# Patient Record
Sex: FEMALE | Race: WHITE | NOT HISPANIC OR LATINO | URBAN - METROPOLITAN AREA
[De-identification: names, ages, dates, MRNs, and addresses within clinical notes are randomized per-mention and may not be internally consistent; named-entity substitution may affect disease eponyms.]

---

## 2024-01-31 ENCOUNTER — APPOINTMENT (OUTPATIENT)
Age: 49
Setting detail: DERMATOLOGY
End: 2024-02-12

## 2024-01-31 VITALS — TEMPERATURE: 97.9 F

## 2024-01-31 DIAGNOSIS — L30.1 DYSHIDROSIS [POMPHOLYX]: ICD-10-CM

## 2024-01-31 PROCEDURE — OTHER MIPS QUALITY: OTHER

## 2024-01-31 PROCEDURE — 99203 OFFICE O/P NEW LOW 30 MIN: CPT

## 2024-01-31 PROCEDURE — OTHER PRESCRIPTION MEDICATION MANAGEMENT: OTHER

## 2024-01-31 PROCEDURE — OTHER COUNSELING: OTHER

## 2024-01-31 PROCEDURE — OTHER PHOTO-DOCUMENTATION: OTHER

## 2024-01-31 PROCEDURE — OTHER PRESCRIPTION: OTHER

## 2024-01-31 PROCEDURE — OTHER ITCH-SCRATCH CYCLE COUNSELING: OTHER

## 2024-01-31 RX ORDER — CLOBETASOL PROPIONATE 0.5 MG/G
OINTMENT TOPICAL BID
Qty: 60 | Refills: 1 | Status: ERX | COMMUNITY
Start: 2024-01-31

## 2024-01-31 ASSESSMENT — LOCATION SIMPLE DESCRIPTION DERM
LOCATION SIMPLE: LEFT HAND
LOCATION SIMPLE: RIGHT HAND

## 2024-01-31 ASSESSMENT — LOCATION DETAILED DESCRIPTION DERM
LOCATION DETAILED: RIGHT ULNAR DORSAL HAND
LOCATION DETAILED: LEFT RADIAL DORSAL HAND

## 2024-01-31 ASSESSMENT — LOCATION ZONE DERM: LOCATION ZONE: HAND

## 2024-01-31 NOTE — PROCEDURE: PRESCRIPTION MEDICATION MANAGEMENT
Render In Strict Bullet Format?: No
Detail Level: Zone
Discontinue Regimen: Hot showers and scented products; switch to lukewarm showers and unscented dove bar soap
Initiate Treatment: clobetasol 0.05 % topical ointment- Apply thin layer to affected areas on hands twice daily for 2 weeks.  Take a one week break between uses. Use PRN for flares. Recommended Neutrogena Norwegian hand cream use. Daily (Zyrtec, Xyzal, Claritin, or Allegra) antihistamine use AM/ Benadryl PM for itching.

## 2024-03-06 ENCOUNTER — APPOINTMENT (OUTPATIENT)
Age: 49
Setting detail: DERMATOLOGY
End: 2024-03-06

## 2024-03-06 DIAGNOSIS — L71.8 OTHER ROSACEA: ICD-10-CM

## 2024-03-06 DIAGNOSIS — L30.1 DYSHIDROSIS [POMPHOLYX]: ICD-10-CM

## 2024-03-06 PROCEDURE — OTHER MIPS QUALITY: OTHER

## 2024-03-06 PROCEDURE — OTHER COUNSELING: OTHER

## 2024-03-06 PROCEDURE — OTHER PRESCRIPTION: OTHER

## 2024-03-06 PROCEDURE — 99213 OFFICE O/P EST LOW 20 MIN: CPT

## 2024-03-06 PROCEDURE — OTHER PRESCRIPTION MEDICATION MANAGEMENT: OTHER

## 2024-03-06 RX ORDER — OXYMETAZOLINE HYDROCHLORIDE 1 G/100G
CREAM TOPICAL
Qty: 30 | Refills: 1 | Status: ERX | COMMUNITY
Start: 2024-03-06

## 2024-03-06 RX ORDER — RUXOLITINIB 15 MG/G
CREAM TOPICAL
Qty: 60 | Refills: 1 | Status: ERX | COMMUNITY
Start: 2024-03-06

## 2024-03-06 ASSESSMENT — LOCATION ZONE DERM: LOCATION ZONE: NOSE

## 2024-03-06 ASSESSMENT — LOCATION SIMPLE DESCRIPTION DERM: LOCATION SIMPLE: NOSE

## 2024-03-06 ASSESSMENT — LOCATION DETAILED DESCRIPTION DERM: LOCATION DETAILED: NASAL DORSUM

## 2024-03-06 NOTE — PROCEDURE: PRESCRIPTION MEDICATION MANAGEMENT
Plan: Patient states the redness on her knuckles are still bothersome. Patient feels that her knuckles are warm to the touch and still has inflammation in the fingers. 2-5pm the inflammation is the worse patient types all day for work, Patient had to cut her wedding due to inflammation. Reviewed med list , discussed potential vasoconstriction from current prescriptions. Discussed documenting flares and keeping record. Since LOV Patient developed the same rash on the nose and bilateral knees. Patient had blood work with pcp to rule out autoimmune 02/2024 and had low iron levels. Patient is perimenopause.
Continue Regimen: - Clobetasol ointment : Apply twice daily to eczema up to 2 weeks/month as needed. Use Opzelura during off week.
Initiate Treatment: - Opzelura 1.5 % topical cream : Apply thin layer to affected areas on hands once to twice daily. Use during off week from topical steroid.
Detail Level: Zone
Render In Strict Bullet Format?: No
Initiate Treatment: - Rhofade 1 % topical cream : Apply thin layer to rosacea QAM

## 2024-03-06 NOTE — HPI: RASH
What Type Of Note Output Would You Prefer (Optional)?: Bullet Format
How Severe Is Your Rash?: moderate
Is This A New Presentation, Or A Follow-Up?: Rash
Additional History: Patient states the redness on her knuckles are still bothersome. Patient feels that her knuckles are warm to the touch and still has inflammation in the fingers. Since LOV Patient developed the same rash on the nose and bilateral knees. Patient had blood work with pcp and had low iron levels.

## 2025-01-22 ENCOUNTER — APPOINTMENT (OUTPATIENT)
Age: 50
Setting detail: DERMATOLOGY
End: 2025-01-25

## 2025-01-22 DIAGNOSIS — L40.59 OTHER PSORIATIC ARTHROPATHY: ICD-10-CM

## 2025-01-22 DIAGNOSIS — L30.1 DYSHIDROSIS [POMPHOLYX]: ICD-10-CM

## 2025-01-22 PROCEDURE — OTHER PRESCRIPTION MEDICATION MANAGEMENT: OTHER

## 2025-01-22 PROCEDURE — OTHER INTRAMUSCULAR KENALOG: OTHER

## 2025-01-22 PROCEDURE — OTHER MIPS QUALITY: OTHER

## 2025-01-22 PROCEDURE — OTHER ADDITIONAL NOTES: OTHER

## 2025-01-22 PROCEDURE — OTHER COUNSELING: OTHER

## 2025-01-22 PROCEDURE — 96372 THER/PROPH/DIAG INJ SC/IM: CPT

## 2025-01-22 PROCEDURE — 99213 OFFICE O/P EST LOW 20 MIN: CPT | Mod: 25

## 2025-01-22 ASSESSMENT — LOCATION DETAILED DESCRIPTION DERM: LOCATION DETAILED: LEFT ANTERIOR SHOULDER

## 2025-01-22 ASSESSMENT — LOCATION SIMPLE DESCRIPTION DERM: LOCATION SIMPLE: LEFT SHOULDER

## 2025-01-22 ASSESSMENT — LOCATION ZONE DERM: LOCATION ZONE: ARM

## 2025-01-22 NOTE — PROCEDURE: PRESCRIPTION MEDICATION MANAGEMENT
Plan: Patient states the redness on her knuckles are still bothersome. Patient feels that her knuckles are warm to the touch and still has inflammation in the fingers. 2-5pm the inflammation is the worse patient types all day for work, Patient had to cut her wedding due to inflammation. Reviewed med list , discussed potential vasoconstriction from current prescriptions. Discussed documenting flares and keeping record. Since LOV Patient developed the same rash on the nose and bilateral knees. Patient had blood work with pcp to rule out autoimmune 02/2024 and had low iron levels. Patient is perimenopause.\\n\\nDiscussed her recent lab results are not indicating that she has an autoimmune disorder.\\n\\nDiscussed Dupixent, OTC Cutemol lotion from [employer], Aquaphor, etc for cracked skin
Continue Regimen: - Clobetasol ointment : Apply twice daily to eczema up to 2 weeks/month as needed. Use Opzelura during off week.\\n\\n- Opzelura 1.5 % topical cream : Apply thin layer to affected areas on hands once to twice daily. Use during off week from topical steroid.
Detail Level: Zone
Render In Strict Bullet Format?: No

## 2025-01-22 NOTE — PROCEDURE: INTRAMUSCULAR KENALOG
Lot # (Optional): [id number]
Consent: The risks of atrophy were reviewed with the patient.
Ndc# (Optional): 003-0293-28
Concentration (Mg/Ml) Of Additional Medication: 2.5
Expiration Date (Optional): MAY 2026
Detail Level: None
Administered By (Optional): Dr. Mcmillan
Kenalog Preparation: kenalog
Add Option For Additional Mediation: No
Concentration (Mg/Ml): 40.0
Total Volume (Ccs): 1

## 2025-01-22 NOTE — PROCEDURE: ADDITIONAL NOTES
Additional Notes: Patient reported also that she had to have her wedding ring cut off due to the extreme swelling of her hands.\\n\\nDactylitis, SANDRA screening WNL, referred to Dr. [email or web address]
Render Risk Assessment In Note?: no
Detail Level: Simple